# Patient Record
Sex: FEMALE | Race: WHITE | Employment: FULL TIME | URBAN - METROPOLITAN AREA
[De-identification: names, ages, dates, MRNs, and addresses within clinical notes are randomized per-mention and may not be internally consistent; named-entity substitution may affect disease eponyms.]

---

## 2017-12-28 ENCOUNTER — OFFICE VISIT (OUTPATIENT)
Dept: ORTHOPEDIC SURGERY | Age: 27
End: 2017-12-28

## 2017-12-28 VITALS
BODY MASS INDEX: 22.21 KG/M2 | WEIGHT: 130.07 LBS | HEIGHT: 64 IN | DIASTOLIC BLOOD PRESSURE: 71 MMHG | HEART RATE: 56 BPM | SYSTOLIC BLOOD PRESSURE: 112 MMHG

## 2017-12-28 DIAGNOSIS — G57.02 PIRIFORMIS SYNDROME OF LEFT SIDE: ICD-10-CM

## 2017-12-28 DIAGNOSIS — M54.50 LUMBAR SPINE PAIN: Primary | ICD-10-CM

## 2017-12-28 PROCEDURE — 99213 OFFICE O/P EST LOW 20 MIN: CPT | Performed by: PHYSICIAN ASSISTANT

## 2017-12-28 PROCEDURE — 1036F TOBACCO NON-USER: CPT | Performed by: PHYSICIAN ASSISTANT

## 2017-12-28 PROCEDURE — G8427 DOCREV CUR MEDS BY ELIG CLIN: HCPCS | Performed by: PHYSICIAN ASSISTANT

## 2017-12-28 PROCEDURE — G8484 FLU IMMUNIZE NO ADMIN: HCPCS | Performed by: PHYSICIAN ASSISTANT

## 2017-12-28 PROCEDURE — G8420 CALC BMI NORM PARAMETERS: HCPCS | Performed by: PHYSICIAN ASSISTANT

## 2017-12-28 RX ORDER — MELOXICAM 15 MG/1
15 TABLET ORAL DAILY
Qty: 90 TABLET | Refills: 0 | Status: SHIPPED | OUTPATIENT
Start: 2017-12-28

## 2017-12-28 RX ORDER — MELOXICAM 15 MG/1
15 TABLET ORAL DAILY
Qty: 30 TABLET | Refills: 3 | Status: SHIPPED | OUTPATIENT
Start: 2017-12-28 | End: 2017-12-28 | Stop reason: CLARIF

## 2017-12-28 NOTE — PROGRESS NOTES
New Patient: SPINE    CHIEF COMPLAINT:    Chief Complaint   Patient presents with    Hip Pain     LEFT glut and lumbar pain increased with running since May; has been seen by MD in [address] [place] [address]; currently in PT--exercises, dry needling; no improvement; glute pain constant with any WB activity; lumbar pain after activity       HISTORY OF PRESENT ILLNESS:                The patient is a 33 y.o. female    Presents to the office today for a new problem. Chief complaint lumbar pain with left  Lower extremity radicular symptoms to her knee but nothing past the knee. Symptoms began in May when she was training for a marathon. She then proceeded to run a marathon and several triathlons with increasing pain symptoms. She denies bowel bladder function. She has been to physical therapy twice a week since November. Questionable involvement of her SI joint. No past medical history contributing to region. She did see an orthopedic surgeon in [address] [place] [address] where she resides permanently. She is only into [place] for the holidays. He recommended physical therapy as stated above but it has been ineffective. History reviewed. No pertinent past medical history. Pain Assessment  Location of Pain: Buttocks  Location Modifiers: Left, Posterior, Inferior  Severity of Pain: 10  Quality of Pain: Sharp  Duration of Pain: Persistent  Frequency of Pain: Intermittent  Aggravating Factors: Exercise  Limiting Behavior: Yes  Relieving Factors: Rest    The pain assessment was noted & reviewed in the medical record today. Current/Past Treatment:   · Physical Therapy:  yes  · Chiropractic:    no  · Injection:    no  · Medications:    Over-the-counter anti-inflammatory medications  · Surgery/Consult: no    Work Status/Functionality:     Past Medical History: Medical history form was reviewed today & scanned into the media tab  History reviewed. No pertinent past medical history. Past Surgical History:     History reviewed. No pertinent surgical history. Current Medications:   No current outpatient prescriptions on file. Allergies:  Review of patient's allergies indicates no known allergies. Social History:    reports that she has never smoked. She has never used smokeless tobacco.  Family History:   History reviewed. No pertinent family history. REVIEW OF SYSTEMS: Full ROS noted & scanned   CONSTITUTIONAL: Denies unexplained weight loss, fevers, chills or fatigue  NEUROLOGICAL: Denies unsteady gait or progressive weakness  MUSCULOSKELETAL: Denies joint swelling or redness  PSYCHOLOGICAL: Denies anxiety, depression   SKIN: Denies skin changes, delayed healing, rash, itching   HEMATOLOGIC: Denies easy bleeding or bruising  ENDOCRINE: Denies excessive thirst, urination, heat/cold  RESPIRATORY: Denies current dyspnea, cough  GI: Denies nausea, vomiting, diarrhea   : Denies bowel or bladder issues       PHYSICAL EXAM:    Vitals: Blood pressure 112/71, pulse 56, height 5' 4.02\" (1.626 m), weight 130 lb 1.1 oz (59 kg). GENERAL EXAM:  · General Apparence: Patient is adequately groomed with no evidence of malnutrition. · Orientation: The patient is oriented to time, place and person. · Mood & Affect:The patient's mood and affect are appropriate   · Vascular: Examination reveals no swelling tenderness in upper or lower extremities. Good capillary refill  · Lymphatic: The lymphatic examination bilaterally reveals all areas to be without enlargement or induration  · Sensation: Sensation is intact without deficit  · Coordination/Balance: Good coordination       LUMBAR/SACRAL EXAMINATION:  · Inspection: Local inspection shows no step-off or bruising. Lumbar alignment is normal.  Sagittal and Coronal balance is neutral.      · Palpation:   No evidence of tenderness at the midline. No tenderness bilaterally at the paraspinal or trochanters. There is no step-off or paraspinal spasm.    Patient does have palpable tenderness over the nonsteroidal anti-inflammatory medication.   When she returns to [place] she should seek out consultation with spine specialist.        Brie Robertson